# Patient Record
Sex: MALE | Race: WHITE | Employment: FULL TIME | URBAN - METROPOLITAN AREA
[De-identification: names, ages, dates, MRNs, and addresses within clinical notes are randomized per-mention and may not be internally consistent; named-entity substitution may affect disease eponyms.]

---

## 2018-07-26 ENCOUNTER — OFFICE VISIT (OUTPATIENT)
Dept: CARDIOLOGY CLINIC | Age: 53
End: 2018-07-26

## 2018-07-26 VITALS
OXYGEN SATURATION: 97 % | WEIGHT: 203 LBS | DIASTOLIC BLOOD PRESSURE: 80 MMHG | SYSTOLIC BLOOD PRESSURE: 124 MMHG | HEART RATE: 69 BPM | HEIGHT: 70 IN | BODY MASS INDEX: 29.06 KG/M2

## 2018-07-26 DIAGNOSIS — I34.0 NON-RHEUMATIC MITRAL REGURGITATION: Primary | ICD-10-CM

## 2018-07-26 DIAGNOSIS — I48.91 ATRIAL FIBRILLATION, UNSPECIFIED TYPE (HCC): ICD-10-CM

## 2018-07-26 DIAGNOSIS — Z91.199 NON-COMPLIANCE: ICD-10-CM

## 2018-07-26 PROCEDURE — 99214 OFFICE O/P EST MOD 30 MIN: CPT | Performed by: INTERNAL MEDICINE

## 2018-07-26 PROCEDURE — 93000 ELECTROCARDIOGRAM COMPLETE: CPT | Performed by: INTERNAL MEDICINE

## 2018-07-26 RX ORDER — AMLODIPINE BESYLATE 10 MG/1
10 TABLET ORAL DAILY
COMMUNITY

## 2018-07-26 RX ORDER — POTASSIUM CHLORIDE 750 MG/1
10 TABLET, FILM COATED, EXTENDED RELEASE ORAL DAILY
COMMUNITY

## 2018-07-26 RX ORDER — METOPROLOL SUCCINATE 50 MG/1
50 TABLET, EXTENDED RELEASE ORAL DAILY
COMMUNITY

## 2018-07-26 RX ORDER — ASPIRIN 325 MG
325 TABLET ORAL DAILY
COMMUNITY

## 2018-07-26 NOTE — PROGRESS NOTES
[address] [facility]   Cardiovascular Evaluation    PATIENT: Cristhian Duran  DATE: 2018  MRN: [id number]  CSN: [id number]  : 1965    Primary Care Doctor: Floyd Martínez MD    Reason for evaluation:   Atrial Fibrillation; Hypertension; and New Patient (last seen )      History of present illness:   Cristhian Duran is a 46 y.o. patient who presents for a his atrial fibrillation, atrial septal defect and mitral regurgitation. He had not been complaint to see us for the past several years. Since last visit he underwent LHC and AVEL on 10/23/14 and was found to have severe mitral regurgitation and significant prolapse. He underwent MVR on 4/30/15 at the [facility]. Today he feels well overall. He did feel \"an extra beat\" a few weeks ago. He does not feel he has has any episodes of a fib. He did have an episode of a \"fast heart beat\" while working in the yard in the heat. Otherwise he has no cardiac complaints. Denies chest pain, palpitations, shortness of breath and syncope. Cardiac Testing: I have reviewed the findings below. [id number] Atrial fibrillation with rapid ventricular responseInferior infarct   ECHO: 2012 CONCLUSION:   1. Moderate to severe mitral valvular insufficiency in the setting of normal   filling pressures. 2. Hyperdynamic left ventricular function with normal coronary arteries. STRESS TEST:  CATH: 2012 CONCLUSION:   1. Moderate to severe mitral valvular insufficiency in the setting of normal   filling pressures. 2. Hyperdynamic left ventricular function with normal coronary arteries. BYPASS:  VASCULAR:    Patient Active Problem List   Diagnosis    Atrial fibrillation with RVR ([address]    ASD (atrial septal defect)    HTN (hypertension)    Hematuria    MR (mitral regurgitation)    Localized osteoarthrosis, lower leg       Past Medical History:   has a past medical history of ASD (atrial septal defect);  Atrial fibrillation with RVR ([facility] [address] Diverticulitis; Hypertension; and Kidney stones. Surgical History:   has a past surgical history that includes Tonsillectomy; Colonoscopy; Lithotripsy; Cardioversion (4/30/12); Cardiac surgery; Cardiac catheterization; Cardiac catheterization; and Cardiac valuve replacement. Social History:   reports that he has never smoked. He has never used smokeless tobacco. He reports that he drinks alcohol. He reports that he does not use drugs. Family History:  No evidence for sudden cardiac death or premature CAD    Home Medications:  Prior to Admission medications    Medication Sig Start Date End Date Taking? Authorizing Provider   metoprolol succinate (TOPROL XL) 50 MG extended release tablet Take 50 mg by mouth daily   Yes Historical Provider, MD   potassium chloride (KLOR-CON) 10 MEQ extended release tablet Take 10 mEq by mouth daily   Yes Historical Provider, MD   amLODIPine (NORVASC) 10 MG tablet Take 10 mg by mouth daily   Yes Historical Provider, MD   aspirin 325 MG tablet Take 325 mg by mouth daily   Yes Historical Provider, MD   furosemide (LASIX) 20 MG tablet  1/30/15  Yes Historical Provider, MD   losartan (COZAAR) 100 MG tablet  2/23/15  Yes Historical Provider, MD   losartan (COZAAR) 100 MG tablet Take 100 mg by mouth daily. 6/2/12 10/23/14  Orestes Martinez MD            Allergies: Avelox [moxifloxacin hcl in nacl] and Ciprofloxacin     Review of Systems:   Review of Systems:   All 14 point review of symptoms completed. Pertinent positives identified in the HPI, all other review of symptoms negative as below.     Review of Systems - History obtained from the patient  General ROS: negative for - chills, fever or night sweats  Psychological ROS: negative for - disorientation or hallucinations  Ophthalmic ROS: negative for - dry eyes, eye pain or loss of vision  ENT ROS: negative for - nasal discharge or sore throat  Allergy and Immunology ROS: negative for - hives or itchy/watery Value Date    WBC 7.9 02/07/2018    RBC 5.39 02/07/2018    HGB 15.9 02/07/2018    HCT 47.2 02/07/2018    MCV 87.6 02/07/2018    RDW 13.2 02/07/2018     02/07/2018     CMP:    Lab Results   Component Value Date     05/08/2018    K 4.3 05/08/2018     05/08/2018    CO2 23 05/08/2018    BUN 17 05/08/2018    CREATININE 0.8 05/08/2018    GFRAA >60 05/08/2018    GFRAA >60 05/02/2012    AGRATIO 1.8 05/08/2018    LABGLOM >60 05/08/2018    GLUCOSE 87 05/08/2018    PROT 7.0 05/08/2018    PROT 7.0 04/29/2012    CALCIUM 9.2 05/08/2018    BILITOT 0.6 05/08/2018    ALKPHOS 59 05/08/2018    AST 19 05/08/2018    ALT 17 05/08/2018        Lab Results   Component Value Date    TROPONINI <0.006 04/29/2012     No components found for: CHLPL  Lab Results   Component Value Date    TRIG 57 02/07/2018    TRIG 76 02/10/2017    TRIG 67 01/08/2016     Lab Results   Component Value Date    HDL 47 02/07/2018    HDL 41 02/10/2017    HDL 45 01/08/2016     Lab Results   Component Value Date    LDLCALC 93 02/07/2018    LDLCALC 102 (H) 02/10/2017    LDLCALC 96 01/08/2016     Lab Results   Component Value Date    LABVLDL 11 02/07/2018    LABVLDL 15 02/10/2017    LABVLDL 13 01/08/2016     Lab Results   Component Value Date    ALT 17 05/08/2018    AST 19 05/08/2018    ALKPHOS 59 05/08/2018    BILITOT 0.6 05/08/2018       Assessment:  46 y.o. male with:   1. Mitral regurgitation moderate to severe due to MVP   ~Clinically appears stable   ~MV repair 4/30/15 at [facility]  2. AF - resolved  3. Non-compliance    Plan:  1. Echocardiogram  2. I recommend that the patient continue their currently prescribed medications. Their drug modifiable risk factors appear to be well controlled. I will continue to address the need/dosing of medications in future visits. 3. The patient was seen for >25 minutes.  >50% of the time was devoted to giving the patient detailed instructions instructions on addressing diet, regular exercise, weight control, smoking abstention, medication compliance, and stress minimization. The patient was provided written and verbal instructions regarding risk factor modification. 4. See me yearly    All questions and concerns were addressed to the patient/family. Alternatives to my treatment were discussed. The note was completed using EMR. Every effort was made to ensure accuracy; however, inadvertent computerized transcription errors may be present.     Lenin Blandon MD, [address], [address] [facility] central office  [phone] [facility] office  [phone] [facility] office  7/26/2018  7:53 AM

## 2018-11-26 ENCOUNTER — HOSPITAL ENCOUNTER (OUTPATIENT)
Age: 53
Discharge: HOME | End: 2018-11-26
Payer: COMMERCIAL

## 2018-11-26 LAB
ANION GAP SERPL CALCULATED.3IONS-SCNC: 13 MMOL/L (ref 3–16)
BUN BLDV-MCNC: 15 MG/DL (ref 7–20)
CALCIUM SERPL-MCNC: 9.2 MG/DL (ref 8.3–10.6)
CHLORIDE BLD-SCNC: 102 MMOL/L (ref 99–110)
CO2: 26 MMOL/L (ref 21–32)
CREAT SERPL-MCNC: 0.8 MG/DL (ref 0.9–1.3)
GFR AFRICAN AMERICAN: >60
GFR NON-AFRICAN AMERICAN: >60
GLUCOSE BLD-MCNC: 116 MG/DL (ref 70–99)
POTASSIUM SERPL-SCNC: 4.2 MMOL/L (ref 3.5–5.1)
SODIUM BLD-SCNC: 141 MMOL/L (ref 136–145)

## 2018-11-26 PROCEDURE — 80048 BASIC METABOLIC PNL TOTAL CA: CPT

## 2018-11-26 PROCEDURE — 36415 COLL VENOUS BLD VENIPUNCTURE: CPT

## 2019-07-26 ENCOUNTER — HOSPITAL ENCOUNTER (OUTPATIENT)
Dept: CARDIOLOGY | Age: 54
Discharge: HOME | End: 2019-07-26
Payer: COMMERCIAL

## 2019-07-26 DIAGNOSIS — I34.0 NON-RHEUMATIC MITRAL REGURGITATION: ICD-10-CM

## 2019-07-26 LAB
LV EF: 58 %
LVEF MODALITY: NORMAL

## 2019-07-26 PROCEDURE — 93306 TTE W/DOPPLER COMPLETE: CPT

## 2019-07-29 ENCOUNTER — TELEPHONE (OUTPATIENT)
Dept: CARDIOLOGY CLINIC | Age: 54
End: 2019-07-29

## 2019-07-29 NOTE — TELEPHONE ENCOUNTER
----- Message from Lyndon Henao MD sent at 7/26/2019  4:44 PM EDT -----  The test does NOT show any major issues from mitral valve and tricuspid repairs. The repairs are normal.      Please inform the patient of the results.

## 2020-10-21 ENCOUNTER — OFFICE VISIT (OUTPATIENT)
Dept: PRIMARY CARE CLINIC | Age: 55
End: 2020-10-21
Payer: COMMERCIAL

## 2020-10-21 PROCEDURE — 99211 OFF/OP EST MAY X REQ PHY/QHP: CPT | Performed by: NURSE PRACTITIONER

## 2020-10-21 NOTE — PATIENT INSTRUCTIONS
You have received a viral test for COVID-19. Below is education on quarantine per the CDC guidelines. For any symptoms, seek care from your PCP, call [phone] to establish care with a doctor, or go directly to an urgent care or the emergency room. Test results will take 2-7 days and will be sent to you in your [employer] account. If you test positive, you will be contacted via phone. If you test negative, the ONLY communication will be through [address] GO TO [employer] AND SIGN UP FOR [employer]  (LOWER LEFT OF THE HOME PAGE)  No test is 100%. If you have symptoms, you should follow the guidance of quarantine as previously stated. You can still be contagious if you have symptoms. Your [facility] Department will reach out to you if you have a positive result. They will provide you with a return to work date and note. If you were tested for a pre-op, then you should remain in quarantine until your procedure. How do I know if I need to be in quarantine? If you live in a community where COVID-19 is or might be spreading (currently, that is virtually everywhere in the [place])  Be alert for symptoms. Watch for fever, cough, shortness of breath, or other symptoms of COVID-19.  Take your temperature if symptoms develop.  Practice social distancing. Maintain 6 feet of distance from others and stay out of crowded places.  Follow CDC guidance if symptoms develop. If you feel healthy but:   Recently had close contact with a person with COVID-19 you need to Quarantine:   Stay home until 14 days after your last exposure.  Check your temperature twice a day and watch for symptoms of COVID-19.  If possible, stay away from people who are at higher-risk for getting very sick from COVID-19.   Stay Home and Monitor Your Health if you:   Have been diagnosed with COVID-19, or   Are waiting for test results, or   Have cough, fever, or shortness of breath, or symptoms of COVID-19      When You Can

## 2020-10-21 NOTE — PROGRESS NOTES
Regi Mcgowan received a viral test for COVID-19. They were educated on isolation and quarantine as appropriate. For any symptoms, they were directed to seek care from their PCP, given contact information to establish with a doctor, directed to an urgent care or the emergency room.

## 2020-10-23 LAB — SARS-COV-2, NAA: NOT DETECTED

## 2020-10-23 NOTE — RESULT ENCOUNTER NOTE

## 2020-10-30 ENCOUNTER — CARE COORDINATION (OUTPATIENT)
Dept: CASE MANAGEMENT | Age: 55
End: 2020-10-30

## 2020-10-30 ENCOUNTER — APPOINTMENT (OUTPATIENT)
Dept: CT IMAGING | Age: 55
End: 2020-10-30
Payer: COMMERCIAL

## 2020-10-30 ENCOUNTER — HOSPITAL ENCOUNTER (EMERGENCY)
Age: 55
Discharge: HOME | End: 2020-10-30
Attending: EMERGENCY MEDICINE
Payer: COMMERCIAL

## 2020-10-30 VITALS
DIASTOLIC BLOOD PRESSURE: 79 MMHG | SYSTOLIC BLOOD PRESSURE: 139 MMHG | RESPIRATION RATE: 16 BRPM | HEIGHT: 70 IN | TEMPERATURE: 98 F | HEART RATE: 77 BPM | OXYGEN SATURATION: 94 % | BODY MASS INDEX: 29.35 KG/M2 | WEIGHT: 205 LBS

## 2020-10-30 LAB
A/G RATIO: 2.2 (ref 1.1–2.2)
ALBUMIN SERPL-MCNC: 4.6 G/DL (ref 3.4–5)
ALP BLD-CCNC: 74 U/L (ref 40–129)
ALT SERPL-CCNC: 18 U/L (ref 10–40)
ANION GAP SERPL CALCULATED.3IONS-SCNC: 13 MMOL/L (ref 3–16)
AST SERPL-CCNC: 12 U/L (ref 15–37)
BASOPHILS ABSOLUTE: 0.1 K/UL (ref 0–0.2)
BASOPHILS RELATIVE PERCENT: 1 %
BILIRUB SERPL-MCNC: 0.6 MG/DL (ref 0–1)
BILIRUBIN URINE: NEGATIVE
BLOOD, URINE: NEGATIVE
BUN BLDV-MCNC: 17 MG/DL (ref 7–20)
CALCIUM SERPL-MCNC: 9.5 MG/DL (ref 8.3–10.6)
CHLORIDE BLD-SCNC: 102 MMOL/L (ref 99–110)
CLARITY: ABNORMAL
CO2: 26 MMOL/L (ref 21–32)
COLOR: YELLOW
CREAT SERPL-MCNC: 1.1 MG/DL (ref 0.9–1.3)
EOSINOPHILS ABSOLUTE: 0.1 K/UL (ref 0–0.6)
EOSINOPHILS RELATIVE PERCENT: 1 %
GFR AFRICAN AMERICAN: >60
GFR NON-AFRICAN AMERICAN: >60
GLOBULIN: 2.1 G/DL
GLUCOSE BLD-MCNC: 220 MG/DL (ref 70–99)
GLUCOSE URINE: 100 MG/DL
HCT VFR BLD CALC: 44 % (ref 40.5–52.5)
HEMOGLOBIN: 15.1 G/DL (ref 13.5–17.5)
KETONES, URINE: NEGATIVE MG/DL
LEUKOCYTE ESTERASE, URINE: NEGATIVE
LYMPHOCYTES ABSOLUTE: 1.2 K/UL (ref 1–5.1)
LYMPHOCYTES RELATIVE PERCENT: 10.4 %
MCH RBC QN AUTO: 29.8 PG (ref 26–34)
MCHC RBC AUTO-ENTMCNC: 34.2 G/DL (ref 31–36)
MCV RBC AUTO: 86.9 FL (ref 80–100)
MICROSCOPIC EXAMINATION: ABNORMAL
MONOCYTES ABSOLUTE: 0.9 K/UL (ref 0–1.3)
MONOCYTES RELATIVE PERCENT: 7.6 %
NEUTROPHILS ABSOLUTE: 9.4 K/UL (ref 1.7–7.7)
NEUTROPHILS RELATIVE PERCENT: 80 %
NITRITE, URINE: NEGATIVE
PDW BLD-RTO: 13.1 % (ref 12.4–15.4)
PH UA: 8.5 (ref 5–8)
PLATELET # BLD: 319 K/UL (ref 135–450)
PMV BLD AUTO: 7.8 FL (ref 5–10.5)
POTASSIUM REFLEX MAGNESIUM: 4 MMOL/L (ref 3.5–5.1)
PROTEIN UA: NEGATIVE MG/DL
RBC # BLD: 5.06 M/UL (ref 4.2–5.9)
SODIUM BLD-SCNC: 141 MMOL/L (ref 136–145)
SPECIFIC GRAVITY UA: 1.01 (ref 1–1.03)
TOTAL PROTEIN: 6.7 G/DL (ref 6.4–8.2)
URINE REFLEX TO CULTURE: ABNORMAL
URINE TYPE: ABNORMAL
UROBILINOGEN, URINE: 0.2 E.U./DL
WBC # BLD: 11.7 K/UL (ref 4–11)

## 2020-10-30 PROCEDURE — 96374 THER/PROPH/DIAG INJ IV PUSH: CPT

## 2020-10-30 PROCEDURE — 6360000002 HC RX W HCPCS: Performed by: EMERGENCY MEDICINE

## 2020-10-30 PROCEDURE — 85025 COMPLETE CBC W/AUTO DIFF WBC: CPT

## 2020-10-30 PROCEDURE — 99284 EMERGENCY DEPT VISIT MOD MDM: CPT

## 2020-10-30 PROCEDURE — 96375 TX/PRO/DX INJ NEW DRUG ADDON: CPT

## 2020-10-30 PROCEDURE — 80053 COMPREHEN METABOLIC PANEL: CPT

## 2020-10-30 PROCEDURE — 81003 URINALYSIS AUTO W/O SCOPE: CPT

## 2020-10-30 PROCEDURE — 74176 CT ABD & PELVIS W/O CONTRAST: CPT

## 2020-10-30 RX ORDER — ONDANSETRON 4 MG/1
4 TABLET, ORALLY DISINTEGRATING ORAL EVERY 8 HOURS PRN
Qty: 10 TABLET | Refills: 0 | Status: SHIPPED | OUTPATIENT
Start: 2020-10-30

## 2020-10-30 RX ORDER — KETOROLAC TROMETHAMINE 30 MG/ML
30 INJECTION, SOLUTION INTRAMUSCULAR; INTRAVENOUS ONCE
Status: COMPLETED | OUTPATIENT
Start: 2020-10-30 | End: 2020-10-30

## 2020-10-30 RX ORDER — TAMSULOSIN HYDROCHLORIDE 0.4 MG/1
0.4 CAPSULE ORAL DAILY
Qty: 5 CAPSULE | Refills: 0 | Status: SHIPPED | OUTPATIENT
Start: 2020-10-30 | End: 2020-11-04

## 2020-10-30 RX ORDER — ONDANSETRON 2 MG/ML
4 INJECTION INTRAMUSCULAR; INTRAVENOUS ONCE
Status: COMPLETED | OUTPATIENT
Start: 2020-10-30 | End: 2020-10-30

## 2020-10-30 RX ORDER — HYDROCODONE BITARTRATE AND ACETAMINOPHEN 5; 325 MG/1; MG/1
1 TABLET ORAL EVERY 4 HOURS PRN
Qty: 16 TABLET | Refills: 0 | Status: SHIPPED | OUTPATIENT
Start: 2020-10-30 | End: 2020-11-02

## 2020-10-30 RX ADMIN — ONDANSETRON 4 MG: 2 INJECTION INTRAMUSCULAR; INTRAVENOUS at 04:59

## 2020-10-30 RX ADMIN — KETOROLAC TROMETHAMINE 30 MG: 30 INJECTION, SOLUTION INTRAMUSCULAR at 04:59

## 2020-10-30 ASSESSMENT — PAIN DESCRIPTION - ORIENTATION: ORIENTATION: LEFT

## 2020-10-30 ASSESSMENT — PAIN DESCRIPTION - LOCATION: LOCATION: FLANK

## 2020-10-30 ASSESSMENT — PAIN SCALES - GENERAL
PAINLEVEL_OUTOF10: 7
PAINLEVEL_OUTOF10: 7

## 2020-10-30 ASSESSMENT — PAIN DESCRIPTION - PAIN TYPE: TYPE: ACUTE PAIN

## 2020-10-30 NOTE — ED NOTES
Discharge instructions reviewed with patient. Medications discussed. Patient informed that medications may cause drowsiness and should not drive or operate equipment while taking this medication. Patient advised to not drink alcohol while taking this medication. Patient also informed that these medications may cause constipation and should increase fluid, fruit, and fiber while taking this medication. Patient voiced understanding. No additional questions asked. Discussed the use of prescribed nausea medication. Encouraged patient to wait 20 to 30 minutes after taking medication before attempting to eat or drink. Recommended clear liquids only for the next 6 to 12 hours then slowly advance diet as tolerated. Patient voiced understanding. No additional questions asked. Discharge instructions reviewed with patient. Reviewed prescriptions with patient. No additional questions asked. Voiced understanding. Encouraged patient to follow up as discussed by the ED physician.      Yennifer Rodriguez RN  10/30/20 [id number]

## 2020-10-30 NOTE — CARE COORDINATION
Ambulatory Care Manager contacted the patient by telephone to perform post discharge assessment. Call within 2 business days of discharge: Yes. Verified name and  with patient as identifiers. Provided introduction to self, and explanation of the ACM role, and reason for call due to risk factors for infection and/or exposure to COVID-19    Discussed COVID-19 related testing which was Patient had negative COVID-19 Test on 10/21/2020; testing was not repeated during ED visit . Reviewed \"What it Means to Have a Negative COVID-19 Test\" and CDC guidlines    Symptoms reviewed with patient who verbalized the following symptoms: cough, nausea, no new symptoms and no worsening symptoms. Due to no new or worsening symptoms encounter was not routed to provider for escalation. Discussed follow-up appointments. If no appointment was previously scheduled, appointment scheduling offered: Patient reports he has scheduled f/u appointment with [facility] on 2020. Patient further reports they had offered an earlier appointment on Monday but he was not able to make this appointment. Discussed s/s to contact PCP/physician on call    [facility](s): No future appointments. [facility] follow up appointment(s):      Non-face-to-face services provided:  Obtained and reviewed discharge summary and/or continuity of care documents     Advance Care Planning:   Does patient have an Advance Directive:  not on file; education provided. Patient has following risk factors of: diabetes and HTN. ACM reviewed discharge instructions, medical action plan and red flags such as increased shortness of breath, increasing fever and signs of decompensation with patient who verbalized understanding. Discussed exposure protocols and quarantine with CDC Guidelines What to do if you are sick with coronavirus disease .  Patient was given an opportunity for questions and concerns.  The patient agrees to contact the local [facility] department [address]: ([phone]) and PCP office for questions related to their healthcare. ACM provided contact information for future needs. Encouraged patient to view the www.cdc.gov web site to re-enforce information reviewed and for COVID-19 updates      Reviewed and educated patient on any new and changed medications related to discharge diagnosis     Patient given information for GetWell Loop and agrees to enroll yes  Patient's preferred e-mail: [email or web address]  Patient's preferred phone number: [phone]  Based on Loop alert triggers, patient will be contacted by nurse care manager for worsening symptoms. Pt will be further monitored by COVID Loop Team based on symptoms and risk factors.

## 2020-11-05 NOTE — ED PROVIDER NOTES
CHIEF COMPLAINT  Flank Pain (pt thinks he has a kidney stone )      HISTORY OF PRESENT ILLNESS  Edilberto Palacios is a 63 y.o. male who presents to the ED with report of L flank pain, thinks he has kidney stone, has had in past. Pain is sharp, non-radiating, not taken anything for pain, nothing makes it better or worse. No hematuria or dysuria or fevers or chills. No abd pain. No other complaints, modifying factors or associated symptoms. I have reviewed the following from the nursing documentation.     Past Medical History:   Diagnosis Date    ASD (atrial septal defect)     Atrial fibrillation with RVR [address] [address]     4/30/12 was cardioverted to Sinus Rhythm    Diverticulitis     Hypertension     Kidney stones      Past Surgical History:   Procedure Laterality Date    CARDIAC CATHETERIZATION      CARDIAC CATHETERIZATION      CARDIAC SURGERY      ASD Repair at age 24years old   [de-identified] CARDIAC VALVE SURGERY      [address] [facility]  4/30/12    converted X 1 shock to Sinus Rhythm    COLONOSCOPY      LITHOTRIPSY      TONSILLECTOMY       Family History   Problem Relation Age of Onset    Heart Disease Mother      Social History     Socioeconomic History    Marital status:      Spouse name: Not on file    Number of children: Not on file    Years of education: Not on file    Highest education level: Not on file   Occupational History    Not on file   Social Needs    Financial resource strain: Not on file    Food insecurity     Worry: Not on file     Inability: Not on file    Transportation needs     Medical: Not on file     Non-medical: Not on file   Tobacco Use    Smoking status: Never Smoker    Smokeless tobacco: Never Used   Substance and Sexual Activity    Alcohol use: Yes     Comment: occasional    Drug use: No    Sexual activity: Yes     Partners: Female   Lifestyle    Physical activity     Days per week: Not on file     Minutes per session: Not on file    Stress: Not on file HEART: RRR. CHEST/LUNGS: Chest atraumatic, nontender, respirations unlabored. CTAB. Good air exchange. Speaking comfortably in full sentences. BACK: No midline spinal tenderness or step-off. L cva tenderness  ABDOMEN: Soft. Non-distended. Non-tender. No guarding or rebound. Normal bowel sounds. EXTREMITIES: No peripheral edema. Moves all extremities equally. All extremities neurovascularly intact. RECTAL/: Deferred  SKIN: Warm and dry. No acute rashes. NEUROLOGICAL: Alert and oriented. CN 2-12 intact, No gross facial drooping. Strength 5/5, sensation intact. Normal coordination. Gait normal.   PSYCHIATRIC: Normal mood and affect. LABS  I have reviewed all labs for this visit.    Results for orders placed or performed during the hospital encounter of 10/30/20   CBC Auto Differential   Result Value Ref Range    WBC 11.7 (H) 4.0 - 11.0 K/uL    RBC 5.06 4.20 - 5.90 M/uL    Hemoglobin 15.1 13.5 - 17.5 g/dL    Hematocrit 44.0 40.5 - 52.5 %    MCV 86.9 80.0 - 100.0 fL    MCH 29.8 26.0 - 34.0 pg    MCHC 34.2 31.0 - 36.0 g/dL    RDW 13.1 12.4 - 15.4 %    Platelets 877 590 - 599 K/uL    MPV 7.8 5.0 - 10.5 fL    Neutrophils % 80.0 %    Lymphocytes % 10.4 %    Monocytes % 7.6 %    Eosinophils % 1.0 %    Basophils % 1.0 %    Neutrophils Absolute 9.4 (H) 1.7 - 7.7 K/uL    Lymphocytes Absolute 1.2 1.0 - 5.1 K/uL    Monocytes Absolute 0.9 0.0 - 1.3 K/uL    Eosinophils Absolute 0.1 0.0 - 0.6 K/uL    Basophils Absolute 0.1 0.0 - 0.2 K/uL   Comprehensive Metabolic Panel w/ Reflex to MG   Result Value Ref Range    Sodium 141 136 - 145 mmol/L    Potassium reflex Magnesium 4.0 3.5 - 5.1 mmol/L    Chloride 102 99 - 110 mmol/L    CO2 26 21 - 32 mmol/L    Anion Gap 13 3 - 16    Glucose 220 (H) 70 - 99 mg/dL    BUN 17 7 - 20 mg/dL    CREATININE 1.1 0.9 - 1.3 mg/dL    GFR Non-African American >60 >60    GFR African American >60 >60    Calcium 9.5 8.3 - 10.6 mg/dL    Total Protein 6.7 6.4 - 8.2 g/dL    Alb 4.6 3.4 - 5.0 g/dL Albumin/Globulin Ratio 2.2 1.1 - 2.2    Total Bilirubin 0.6 0.0 - 1.0 mg/dL    Alkaline Phosphatase 74 40 - 129 U/L    ALT 18 10 - 40 U/L    AST 12 (L) 15 - 37 U/L    Globulin 2.1 g/dL   Urinalysis Reflex to Culture   Result Value Ref Range    Color, UA Yellow Straw/Yellow    Clarity, UA SL CLOUDY (A) Clear    Glucose, Ur 100 (A) Negative mg/dL    Bilirubin Urine Negative Negative    Ketones, Urine Negative Negative mg/dL    Specific Gravity, UA 1.015 1.005 - 1.030    Blood, Urine Negative Negative    pH, UA 8.5 (A) 5.0 - 8.0    Protein, UA Negative Negative mg/dL    Urobilinogen, Urine 0.2 <2.0 E.U./dL    Nitrite, Urine Negative Negative    Leukocyte Esterase, Urine Negative Negative    Microscopic Examination Not Indicated     Urine Type NotGiven     Urine Reflex to Culture Not Indicated            RADIOLOGY  X-RAYS:  I have reviewed radiologic plain film image(s). ALL OTHER NON-PLAIN FILM IMAGES SUCH AS CT, ULTRASOUND AND MRI HAVE BEEN READ BY THE RADIOLOGIST. CT ABDOMEN PELVIS WO CONTRAST Additional Contrast? None   Final Result   Obstructing 5-6 mm left UVJ stone with mild upstream hydroureteronephrosis. Additional nonobstructing left intrarenal stones present. Nonobstructive right nephrolithiasis. Mild prostatomegaly. Bilateral renal cysts (Bosniak I/II). Colonic diverticulosis. Hepatomegaly with steatosis. ED COURSE/MDM  Patient seen and evaluated. Pt with 5/6mm L UVJ stone, pain better after meds, will dc home with rx. I estimate there is LOW risk for ABDOMINAL AORTIC ANEURYSM, CAUDA EQUINA SYNDROME, EPIDURAL MASS LESION, SPINAL STENOSIS, OR HERNIATED DISK CAUSING SEVERE STENOSIS, thus I consider the discharge disposition reasonable. Troy Conway and I have discussed the diagnosis and risks, and we agree with discharging home to follow-up with their primary doctor.  We also discussed returning to the Emergency Department immediately if new or worsening

## 2020-11-10 ENCOUNTER — HOSPITAL ENCOUNTER (OUTPATIENT)
Dept: GENERAL RADIOLOGY | Age: 55
Discharge: HOME | End: 2020-11-10
Payer: COMMERCIAL

## 2020-11-10 PROCEDURE — 74018 RADEX ABDOMEN 1 VIEW: CPT

## 2024-01-18 NOTE — PROGRESS NOTES
[facility]   Cardiac Consultation    Referring Provider:  Shelton Cantrell MD     Chief Complaint   Patient presents with    New Patient     Seen [facility] previously.     Atrial Fibrillation    Hypertension    Edema     Ankles.     Palpitations     When he lays he feels a skipping beat at times.       Nicholas Osorio   1965    History of Present Illness:    Nicholas Osorio is a 58 y.o. male who is here today as a new patient, self referral to reestablish cardiology care for valvular heart dz. Patient last seen 2018. She has a past medical history of atrial fibrillation, atrial septal defect, coronary artery disease, hypertension, mitral reg and mitral valve prolapse.    He underwent a left heart cath in 2014 which showed normal coronary arteries. AVEL on 10/23/14 and was found to have severe mitral regurgitation and significant prolapse. In 2015 he underwent- Mitral valve repair including triangular resection of P2, leaflet plication, and insertion of Ramirez band #33. Tricuspid valve repair with Allison classic annuloplasty ring #32 and maze procedure with cryo.    Today he states he has not seen cardiology since 2018. He states he has lost a significant amount of weight and has been able to stop his metformin. He has occasional ankle swelling that started years ago which comes on after he sits for a long period of time. He has not been monitoring his blood pressure at home. Last OV with PCP it was 135/70's. He states he has had some palpitations that started over the last few months when he lays on his side at night. Palpitations comes on several nights per week and generally resolve after he rest for a few minutes. No palpitations during the day or associated symptoms. He stays active with regular exercise and has not noted any decrease in his exercise tolerance. Patient currently denies any weight gain, edema, chest pain, shortness of breath, dizziness, and syncope. He wore a monitor worn

## 2024-01-19 ENCOUNTER — OFFICE VISIT (OUTPATIENT)
Dept: CARDIOLOGY CLINIC | Age: 59
End: 2024-01-19
Payer: COMMERCIAL

## 2024-01-19 VITALS
SYSTOLIC BLOOD PRESSURE: 161 MMHG | HEIGHT: 70 IN | HEART RATE: 61 BPM | WEIGHT: 186 LBS | BODY MASS INDEX: 26.63 KG/M2 | DIASTOLIC BLOOD PRESSURE: 92 MMHG | OXYGEN SATURATION: 99 %

## 2024-01-19 DIAGNOSIS — R00.2 PALPITATIONS: ICD-10-CM

## 2024-01-19 DIAGNOSIS — Z76.89 ESTABLISHING CARE WITH NEW DOCTOR, ENCOUNTER FOR: Primary | ICD-10-CM

## 2024-01-19 DIAGNOSIS — Q21.10 ASD (ATRIAL SEPTAL DEFECT): ICD-10-CM

## 2024-01-19 DIAGNOSIS — I34.0 NONRHEUMATIC MITRAL VALVE REGURGITATION: ICD-10-CM

## 2024-01-19 DIAGNOSIS — I10 PRIMARY HYPERTENSION: ICD-10-CM

## 2024-01-19 DIAGNOSIS — Z98.890 S/P MITRAL VALVE REPAIR: ICD-10-CM

## 2024-01-19 DIAGNOSIS — R00.2 PALPITATION: ICD-10-CM

## 2024-01-19 DIAGNOSIS — Z86.79 HISTORY OF ATRIAL FIBRILLATION: ICD-10-CM

## 2024-01-19 PROCEDURE — 3079F DIAST BP 80-89 MM HG: CPT | Performed by: INTERNAL MEDICINE

## 2024-01-19 PROCEDURE — 93000 ELECTROCARDIOGRAM COMPLETE: CPT | Performed by: INTERNAL MEDICINE

## 2024-01-19 PROCEDURE — 3077F SYST BP >= 140 MM HG: CPT | Performed by: INTERNAL MEDICINE

## 2024-01-19 PROCEDURE — 99204 OFFICE O/P NEW MOD 45 MIN: CPT | Performed by: INTERNAL MEDICINE

## 2024-01-19 RX ORDER — ATORVASTATIN CALCIUM 10 MG/1
10 TABLET, FILM COATED ORAL DAILY
COMMUNITY

## 2024-01-19 NOTE — PATIENT INSTRUCTIONS
Plan:  ~Discussed stopping Lipitor for 3 months and see how your fasting lipids are   ~Discussed repeating a cardiac event monitor- will hold off for now. Call if you start to have longer episodes of palpitations or more frequent   ~Discussed amlodipine may be contributing to ankle swelling. Call if swelling gets worse   ~Recommend an echocardiogram which is an ultrasound of your heart to evaluate heart function, structures and valves.   Cardiac medications reviewed including indications and pertinent side effects. Medication list updated at this visit.   Check blood pressure and heart rate at home a few times per week- keep a log with dates and times and bring to office visit   Regular exercise and following a healthy diet encouraged   Follow up with me in 1 year

## 2024-01-29 ENCOUNTER — PROCEDURE VISIT (OUTPATIENT)
Dept: CARDIOLOGY CLINIC | Age: 59
End: 2024-01-29
Payer: COMMERCIAL

## 2024-01-29 ENCOUNTER — TELEPHONE (OUTPATIENT)
Dept: CARDIOLOGY CLINIC | Age: 59
End: 2024-01-29

## 2024-01-29 DIAGNOSIS — Q21.10 ASD (ATRIAL SEPTAL DEFECT): ICD-10-CM

## 2024-01-29 DIAGNOSIS — I34.0 NONRHEUMATIC MITRAL VALVE REGURGITATION: ICD-10-CM

## 2024-01-29 DIAGNOSIS — I10 PRIMARY HYPERTENSION: ICD-10-CM

## 2024-01-29 DIAGNOSIS — Z98.890 S/P MITRAL VALVE REPAIR: ICD-10-CM

## 2024-01-29 DIAGNOSIS — R00.2 PALPITATION: ICD-10-CM

## 2024-01-29 PROCEDURE — 93306 TTE W/DOPPLER COMPLETE: CPT | Performed by: INTERNAL MEDICINE

## 2024-01-29 NOTE — TELEPHONE ENCOUNTER
Spoke with patient. He would like you to comment on-   Severe left atrial dilatation on echo report.

## 2024-01-29 NOTE — TELEPHONE ENCOUNTER
----- Message from Michael Pedroza MD sent at 1/29/2024  1:45 PM EST -----  Please notify patient that their echo looks ok  Heart fxn normal  Valve repairs look good

## 2024-01-29 NOTE — TELEPHONE ENCOUNTER
That is common with his prior valve issues and not a concern at this time  Will monitor w/ repeat echo next year

## 2025-01-24 ENCOUNTER — OFFICE VISIT (OUTPATIENT)
Dept: CARDIOLOGY CLINIC | Age: 60
End: 2025-01-24
Payer: COMMERCIAL

## 2025-01-24 VITALS
SYSTOLIC BLOOD PRESSURE: 122 MMHG | DIASTOLIC BLOOD PRESSURE: 72 MMHG | HEART RATE: 72 BPM | WEIGHT: 219 LBS | OXYGEN SATURATION: 96 % | BODY MASS INDEX: 31.35 KG/M2 | HEIGHT: 70 IN

## 2025-01-24 DIAGNOSIS — I10 PRIMARY HYPERTENSION: Primary | ICD-10-CM

## 2025-01-24 DIAGNOSIS — Z98.890 S/P MITRAL VALVE REPAIR: ICD-10-CM

## 2025-01-24 PROCEDURE — 3078F DIAST BP <80 MM HG: CPT | Performed by: INTERNAL MEDICINE

## 2025-01-24 PROCEDURE — 99214 OFFICE O/P EST MOD 30 MIN: CPT | Performed by: INTERNAL MEDICINE

## 2025-01-24 PROCEDURE — 3074F SYST BP LT 130 MM HG: CPT | Performed by: INTERNAL MEDICINE

## 2025-01-24 PROCEDURE — 93000 ELECTROCARDIOGRAM COMPLETE: CPT | Performed by: INTERNAL MEDICINE

## 2025-01-24 RX ORDER — MELOXICAM 15 MG/1
1 TABLET ORAL PRN
COMMUNITY
Start: 2024-08-23

## 2025-01-24 NOTE — PROGRESS NOTES
[facility]   Cardiac Follow up     Referring Provider:  Shelton Cantrell MD     Chief Complaint   Patient presents with    Follow-up    Hypertension    Edema     In feet, but sits all day      Nicholas Osorio   1965    History of Present Illness:    Nicholas Osorio is a 59 y.o. male who is here today for follow up for a past medical history of atrial fibrillation, atrial septal defect, coronary artery disease, hypertension, mitral reg and mitral valve prolapse.    He underwent a left heart cath in 2014 which showed normal coronary arteries. AVEL on 10/23/14 and was found to have severe mitral regurgitation and significant prolapse. In 2015 he underwent- Mitral valve repair including triangular resection of P2, leaflet plication, and insertion of Ramirez band #33. Tricuspid valve repair with Allison classic annuloplasty ring #32 and maze procedure with cryo. Repeat echocardiogram 1/29/2024 showed an EF of 55-60%.    Today he states he has been feeling well since his last visit. He is tolerating his medications and is taking them as prescribed. He stopped Lipitor due to side effects. He is taking Lasix daily. Some leg swelling when he sits for long periods. Patient currently denies any weight gain, palpitations, chest pain, shortness of breath, dizziness, and syncope.              Past Medical History:   has a past medical history of ASD (atrial septal defect), Atrial fibrillation with RVR (HCC), Diverticulitis, Hypertension, and Kidney stones.    Surgical History:   has a past surgical history that includes Tonsillectomy; Colonoscopy; Lithotripsy; Cardioversion (4/30/12); Cardiac surgery; Cardiac catheterization; Cardiac catheterization; and Cardiac valuve replacement.     Social History:   reports that he has never smoked. He has never used smokeless tobacco. He reports current alcohol use. He reports that he does not use drugs.     Family History:  family history includes Heart Disease in

## 2025-01-24 NOTE — PATIENT INSTRUCTIONS
~Will discuss when to repeat echocardiogram next year   ~Stop Lasix, take take as needed for swelling   ~Remain off Lipitor     Cardiac medications reviewed including indications and pertinent side effects. Medication list updated at this visit.   Patient verbalizes understanding of the need for treatment and education has been provided at today's visit. Additional education material will be provided in after visit summary.    Check blood pressure and heart rate at home a few times per week- keep a log with dates and times and bring to office visit   Regular exercise and following a healthy diet encouraged   Follow up with me in 1 year

## 2025-01-24 NOTE — PROGRESS NOTES
[facility]   Cardiac Follow up     Referring Provider:  Shelton Cantrell MD     Chief Complaint   Patient presents with    Follow-up    Hypertension    Edema     In feet, but sits all day      Nicholas Osorio   1965    History of Present Illness:    Nicholas Osorio is a 59 y.o. male who is here today for follow up for a past medical history of atrial fibrillation, atrial septal defect, coronary artery disease, hypertension, mitral reg and mitral valve prolapse.    He underwent a left heart cath in 2014 which showed normal coronary arteries. AVEL on 10/23/14 and was found to have severe mitral regurgitation and significant prolapse. In 2015 he underwent- Mitral valve repair including triangular resection of P2, leaflet plication, and insertion of Ramirez band #33. Tricuspid valve repair with Allison classic annuloplasty ring #32 and maze procedure with cryo. Repeat echocardiogram 1/29/2024 showed an EF of 55-60%.    Today he states he has been feeling well since his last visit. He is tolerating his medications and is taking them as prescribed. He stopped Lipitor due to side effects. He is taking Lasix daily. Some leg swelling when he sits for long periods. Patient currently denies any weight gain, palpitations, chest pain, shortness of breath, dizziness, and syncope.              Past Medical History:   has a past medical history of ASD (atrial septal defect), Atrial fibrillation with RVR (HCC), Diverticulitis, Hypertension, and Kidney stones.    Surgical History:   has a past surgical history that includes Tonsillectomy; Colonoscopy; Lithotripsy; Cardioversion (4/30/12); Cardiac surgery; Cardiac catheterization; Cardiac catheterization; and Cardiac valuve replacement.     Social History:   reports that he has never smoked. He has never used smokeless tobacco. He reports current alcohol use. He reports that he does not use drugs.     Family History:  family history includes Heart Disease in

## 2025-02-21 ENCOUNTER — TELEPHONE (OUTPATIENT)
Dept: CARDIOLOGY CLINIC | Age: 60
End: 2025-02-21

## 2025-02-21 RX ORDER — ASPIRIN 81 MG/1
81 TABLET ORAL DAILY
COMMUNITY
Start: 2025-02-21

## 2025-02-21 RX ORDER — AMOXICILLIN 500 MG/1
2000 CAPSULE ORAL PRN
Qty: 4 CAPSULE | Refills: 3 | Status: SHIPPED | OUTPATIENT
Start: 2025-02-21

## 2025-02-21 NOTE — TELEPHONE ENCOUNTER
Spoke w/ patient  Ok for surgery  Reduce ASA to 81 mg daily and do not hold for surgery  Take amoxicillin 2 grams prior to surgery - script sent

## 2025-02-21 NOTE — TELEPHONE ENCOUNTER
[facility] asking for preoperative cardiac risk assessment for right knee surgery.     Asking for office notes, EKG and recent testing.     Fax letter to [facility] at [phone]    Patient last seen 1/24/2025

## 2025-07-02 ENCOUNTER — TELEPHONE (OUTPATIENT)
Dept: CARDIOLOGY CLINIC | Age: 60
End: 2025-07-02

## 2025-07-02 NOTE — TELEPHONE ENCOUNTER
CARDIAC CLEARANCE REQUEST    What type of procedure are you having:  Left knee replacement  Are you taking any blood thinners:  aspirin 81 MG    Type on anesthesia:  general  When is your procedure scheduled for:  Fall 2025   What physician is performing your procedure:  Dr. Watt  Phone Number:  [phone]  Fax number to send the letter:  [phone]    Last ov 1.24.25 [facility] [place]